# Patient Record
Sex: MALE | Race: WHITE | URBAN - METROPOLITAN AREA
[De-identification: names, ages, dates, MRNs, and addresses within clinical notes are randomized per-mention and may not be internally consistent; named-entity substitution may affect disease eponyms.]

---

## 2017-01-23 ENCOUNTER — OFFICE VISIT (OUTPATIENT)
Dept: GASTROENTEROLOGY | Facility: CLINIC | Age: 57
End: 2017-01-23

## 2017-01-23 VITALS
HEART RATE: 102 BPM | SYSTOLIC BLOOD PRESSURE: 146 MMHG | HEIGHT: 75 IN | DIASTOLIC BLOOD PRESSURE: 74 MMHG | WEIGHT: 259.19 LBS | BODY MASS INDEX: 32.23 KG/M2

## 2017-01-23 DIAGNOSIS — K21.9 GASTROESOPHAGEAL REFLUX DISEASE WITHOUT ESOPHAGITIS: ICD-10-CM

## 2017-01-23 DIAGNOSIS — E66.9 OBESITY (BMI 30-39.9): Primary | ICD-10-CM

## 2017-01-23 PROBLEM — I10 HYPERTENSION: Status: ACTIVE | Noted: 2017-01-23

## 2017-01-23 PROCEDURE — 99212 OFFICE O/P EST SF 10 MIN: CPT | Performed by: INTERNAL MEDICINE

## 2017-01-23 PROCEDURE — 99243 OFF/OP CNSLTJ NEW/EST LOW 30: CPT | Performed by: INTERNAL MEDICINE

## 2017-01-23 RX ORDER — AZILSARTAN KAMEDOXOMIL 80 MG/1
80 TABLET ORAL DAILY
Refills: 0 | COMMUNITY
Start: 2017-01-16

## 2017-01-23 RX ORDER — LOSARTAN POTASSIUM 50 MG/1
50 TABLET ORAL DAILY
Refills: 0 | COMMUNITY
Start: 2016-12-19

## 2017-01-23 RX ORDER — OMEPRAZOLE 40 MG/1
40 CAPSULE, DELAYED RELEASE ORAL DAILY
Refills: 1 | COMMUNITY
Start: 2016-12-10

## 2017-01-23 RX ORDER — LABETALOL 200 MG/1
200 TABLET, FILM COATED ORAL ONCE AS NEEDED
Refills: 2 | COMMUNITY
Start: 2017-01-13

## 2017-01-23 RX ORDER — EMPAGLIFLOZIN AND LINAGLIPTIN 25; 5 MG/1; MG/1
25 TABLET, FILM COATED ORAL DAILY
Refills: 3 | COMMUNITY
Start: 2017-01-13

## 2017-01-23 RX ORDER — DEXAMETHASONE 2 MG/1
2 TABLET ORAL DAILY
Refills: 0 | COMMUNITY
Start: 2016-11-30

## 2017-01-23 RX ORDER — SILDENAFIL CITRATE 100 MG
100 TABLET ORAL ONCE AS NEEDED
Refills: 0 | COMMUNITY
Start: 2017-01-14

## 2017-01-23 NOTE — PATIENT INSTRUCTIONS
1.  May taper antireflux medication as symptoms allow. 2.  See the dietitian regarding weight loss. 3.  Colonoscopy every 10 years. 4.  Please contact me with any ongoing or new digestive symptoms.

## 2017-01-24 NOTE — PROGRESS NOTES
HPI:    Patient ID: Lara Dillon is a 61year old male. HPI  The patient is self-referred to me. He was advised to see a gastroenterologist by his primary care physician for gastroesophageal reflux.     The patient saw an ENT physician for throat d 0   dexamethasone 2 MG Oral Tab Take 2 mg by mouth daily. Disp:  Rfl: 0   DEXILANT 60 MG Oral Capsule Delayed Release Take 60 mg by mouth daily. Disp:  Rfl: 1   GLYXAMBI 25-5 MG Oral Tab Take 25 tablets by mouth daily.  Disp:  Rfl: 3   Labetalol HCl 200 MG examination findings with regards to his throat constitute sequelae of either acid mediated reflux or non-acid mediated reflux. I would have no objection to the patient tapering his acid suppression and assessing symptoms.   He has no signs on endoscopy to

## (undated) NOTE — MR AVS SNAPSHOT
[facility]  [address] [place] [address]  [phone]               Thank you for choosing us for your health care visit with Ayla Porras MD.  We are glad to serve you and happy to provide you with this summary of your vis Generic drug:  Sildenafil Citrate   Take 100 mg by mouth once as needed. Today's Orders     DIETITIAN EDUCATION NON-DIABETES, DIET (INTERNAL)    Complete by:  As directed    Assoc Dx:  Obesity (BMI 30-39. 9) [E66.9]                 [employer] Your unique "[employer] Access Code: [email or web address]  Expires: 3/24/2017  1:47 PM    If you have questions, you can call [phone] to talk to our [facility] Staff. Remember, "[employer] is NOT to be used for urgent needs. For medical emergencies, dial 911. Water is best for hydration Fast food. Eat at home when possible     Tips for increasing your physical activity – Adults who are physically active are less likely to develop some chronic diseases than adults who are inactive.      HOW TO GET STARTED: HOW